# Patient Record
Sex: FEMALE | Race: WHITE | NOT HISPANIC OR LATINO | URBAN - METROPOLITAN AREA
[De-identification: names, ages, dates, MRNs, and addresses within clinical notes are randomized per-mention and may not be internally consistent; named-entity substitution may affect disease eponyms.]

---

## 2020-12-10 ENCOUNTER — TELEHEALTH PROVIDED OTHER THAN IN PATIENT'S HOME (OUTPATIENT)
Dept: URBAN - METROPOLITAN AREA TELEHEALTH 12 | Facility: TELEHEALTH | Age: 40
End: 2020-12-10

## 2020-12-10 VITALS — WEIGHT: 208 LBS | HEIGHT: 63 IN

## 2020-12-10 DIAGNOSIS — K59.09 OTHER CONSTIPATION: ICD-10-CM

## 2020-12-10 DIAGNOSIS — R10.33 PERIUMBILICAL PAIN: ICD-10-CM

## 2020-12-10 DIAGNOSIS — K21.00 GASTRO-ESOPHAGEAL REFLUX DISEASE WITH ESOPHAGITIS, WITHOUT B: ICD-10-CM

## 2020-12-10 DIAGNOSIS — K92.1 MELENA: ICD-10-CM

## 2020-12-10 DIAGNOSIS — R19.7 DIARRHEA, UNSPECIFIED: ICD-10-CM

## 2020-12-10 PROCEDURE — 99244 OFF/OP CNSLTJ NEW/EST MOD 40: CPT | Mod: 95 | Performed by: INTERNAL MEDICINE

## 2020-12-10 RX ORDER — FAMOTIDINE 40 MG/1
TABLET, FILM COATED ORAL
Qty: 30 | Refills: 10 | Status: ACTIVE
Start: 2020-12-10

## 2020-12-10 NOTE — SERVICEHPINOTES
PATIENT VERIFIED BY DATE OF BIRTH AND NAME. Patient has been consented for this telecommunication visit.   40 with chronic alternating diarrhea and constipation since her teens.  She has a bowel movement every three days.  She also has mild chronic lower abdominal pain that will last hours and can be relieved with defecation.  Pain has no relation to certain foods or eating.  She has straining with defecation and rectal bleeding when she strains.  She sees blood on toilet paper when she wipes  No change in appetite, weight loss.  She has mild nausea all day but no vomiting.  She has moderate to severe heartburn and regurgitation for many years, and she uses TUMS as needed with partial.        She denies dysphagia.  Colonoscopy over 10 years ago reportedly normal.

## 2021-02-10 ENCOUNTER — OFFICE (OUTPATIENT)
Dept: URBAN - METROPOLITAN AREA CLINIC 102 | Facility: CLINIC | Age: 41
End: 2021-02-10

## 2021-02-10 PROCEDURE — 00031: CPT | Performed by: INTERNAL MEDICINE

## 2021-02-12 ENCOUNTER — ON CAMPUS - OUTPATIENT (OUTPATIENT)
Dept: URBAN - METROPOLITAN AREA HOSPITAL 37 | Facility: HOSPITAL | Age: 41
End: 2021-02-12
Payer: COMMERCIAL

## 2021-02-12 DIAGNOSIS — K29.60 OTHER GASTRITIS WITHOUT BLEEDING: ICD-10-CM

## 2021-02-12 DIAGNOSIS — K21.9 GASTRO-ESOPHAGEAL REFLUX DISEASE WITHOUT ESOPHAGITIS: ICD-10-CM

## 2021-02-12 DIAGNOSIS — R10.33 PERIUMBILICAL PAIN: ICD-10-CM

## 2021-02-12 PROCEDURE — 45378 DIAGNOSTIC COLONOSCOPY: CPT | Performed by: INTERNAL MEDICINE

## 2021-02-12 PROCEDURE — 43239 EGD BIOPSY SINGLE/MULTIPLE: CPT | Performed by: INTERNAL MEDICINE

## 2021-04-06 ENCOUNTER — OFFICE (OUTPATIENT)
Dept: URBAN - METROPOLITAN AREA TELEHEALTH 3 | Facility: TELEHEALTH | Age: 41
End: 2021-04-06

## 2021-04-06 VITALS — HEIGHT: 63 IN | WEIGHT: 201 LBS

## 2021-04-06 DIAGNOSIS — R19.7 DIARRHEA, UNSPECIFIED: ICD-10-CM

## 2021-04-06 DIAGNOSIS — K59.09 OTHER CONSTIPATION: ICD-10-CM

## 2021-04-06 PROCEDURE — 99213 OFFICE O/P EST LOW 20 MIN: CPT | Performed by: INTERNAL MEDICINE

## 2021-04-06 NOTE — SERVICEHPINOTES
PATIENT VERIFIED BY DATE OF BIRTH AND NAME. Patient has been consented for this phone visit.   41 yo female with chronic alternating constipation   and diarrhea.  She will go through months of infrequent stools, followed by diarrhea.   She tried Linzess 290 mcg po qd for 1 month but she had infrequent stools consisting of diarrhea.   No abdominal pain, bloating, nausea, vomiting, fever, chills, chest pain, shortness of breath, change in appetite, weight loss

## 2021-04-27 ENCOUNTER — OFFICE (OUTPATIENT)
Dept: URBAN - METROPOLITAN AREA TELEHEALTH 12 | Facility: TELEHEALTH | Age: 41
End: 2021-04-27

## 2021-04-27 VITALS — HEIGHT: 63 IN | WEIGHT: 201 LBS

## 2021-04-27 DIAGNOSIS — K59.09 OTHER CONSTIPATION: ICD-10-CM

## 2021-04-27 DIAGNOSIS — R19.7 DIARRHEA, UNSPECIFIED: ICD-10-CM

## 2021-04-27 DIAGNOSIS — K21.00 GASTRO-ESOPHAGEAL REFLUX DISEASE WITH ESOPHAGITIS, WITHOUT B: ICD-10-CM

## 2021-04-27 DIAGNOSIS — R10.33 PERIUMBILICAL PAIN: ICD-10-CM

## 2021-04-27 PROCEDURE — 99442: CPT | Performed by: INTERNAL MEDICINE

## 2021-04-27 NOTE — SERVICEHPINOTES
PATIENT VERIFIED BY DATE OF BIRTH AND NAME. Patient has been consented for this phone visit. PATIENT VERIFIED BY DATE OF BIRTH AND NAME. Patient has been consented for this phone visit. 41 yo female with chronic alternating constipation and diarrheathe for many years, which worsened at the beginning of the years. She will go through months of infrequent stools, followed by diarrhea. She tried Linzess 290 mcg po qd for 1 month but she had infrequent stools consisting of diarrhea. After her last visit, she tried Amitiza 24 mcg po bid, Magnesium 2000 mg po qhs, and her constipation and abdominal cramps have improved 50%.   When she has diarrhea she takes Levsin as needed with moderate relief.    No abdominal pain, bloating, nausea, vomiting, fever, chills, chest pain, shortness of breath, change in appetite, weight loss

## 2022-01-05 ENCOUNTER — OFFICE (OUTPATIENT)
Dept: URBAN - METROPOLITAN AREA CLINIC 102 | Facility: CLINIC | Age: 42
End: 2022-01-05

## 2022-01-05 VITALS
HEIGHT: 63 IN | DIASTOLIC BLOOD PRESSURE: 81 MMHG | SYSTOLIC BLOOD PRESSURE: 137 MMHG | WEIGHT: 211 LBS | TEMPERATURE: 95.2 F | HEART RATE: 83 BPM

## 2022-01-05 DIAGNOSIS — K92.1 MELENA: ICD-10-CM

## 2022-01-05 DIAGNOSIS — R10.33 PERIUMBILICAL PAIN: ICD-10-CM

## 2022-01-05 DIAGNOSIS — K58.0 IRRITABLE BOWEL SYNDROME WITH DIARRHEA: ICD-10-CM

## 2022-01-05 PROCEDURE — 99215 OFFICE O/P EST HI 40 MIN: CPT

## 2022-01-05 NOTE — SERVICEHPINOTES
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 She reports chronic constipation alternating w/ diarrhea. Seen by Dr Mendiola in the past but reports worsening sx over the past year or so. Constipation for a couple days (BMs every 3 days) then a couple days of diarrhea. Linzess caused diarrhea but did not cause more freq stools. She has been taking amitiza 24mcg BID and metamucil which seems to help a bit. Also reports generalized abd pain which makes it difficult for her to work (as does diarrhea) (left or right sided). Does not feel pain improves after BMs.
br Bloating, fullness Levsin helps pain but causes constipation. +nausea freq, no vomiting. Zofran helps but can constipate her as well. 
br rectal bleeding after BMs recently. EGD/colonoscopy 2/2021 for these symptoms showed internal hemorrhoids, gastritis, no h pylori.br She has an aunt and uncle w/ crohn's. No prior imaging for symptoms.br  [ROS Wording]

## 2022-01-10 RX ORDER — RIFAXIMIN 550 MG/1
TABLET ORAL
Qty: 42 | Refills: 0 | Status: ACTIVE
Start: 2022-01-10